# Patient Record
Sex: MALE | Race: WHITE | NOT HISPANIC OR LATINO | Employment: FULL TIME | URBAN - METROPOLITAN AREA
[De-identification: names, ages, dates, MRNs, and addresses within clinical notes are randomized per-mention and may not be internally consistent; named-entity substitution may affect disease eponyms.]

---

## 2023-03-20 ENCOUNTER — OFFICE VISIT (OUTPATIENT)
Dept: PRIMARY CARE | Facility: CLINIC | Age: 28
End: 2023-03-20
Payer: COMMERCIAL

## 2023-03-20 VITALS — DIASTOLIC BLOOD PRESSURE: 74 MMHG | WEIGHT: 204 LBS | TEMPERATURE: 97.6 F | SYSTOLIC BLOOD PRESSURE: 112 MMHG

## 2023-03-20 DIAGNOSIS — R53.83 FATIGUE, UNSPECIFIED TYPE: ICD-10-CM

## 2023-03-20 DIAGNOSIS — I10 HYPERTENSION, UNSPECIFIED TYPE: Primary | ICD-10-CM

## 2023-03-20 DIAGNOSIS — E78.5 HYPERLIPIDEMIA, UNSPECIFIED HYPERLIPIDEMIA TYPE: ICD-10-CM

## 2023-03-20 DIAGNOSIS — L72.9 CYST OF SCROTUM: ICD-10-CM

## 2023-03-20 PROCEDURE — 99385 PREV VISIT NEW AGE 18-39: CPT | Performed by: INTERNAL MEDICINE

## 2023-03-20 PROCEDURE — 3074F SYST BP LT 130 MM HG: CPT | Performed by: INTERNAL MEDICINE

## 2023-03-20 PROCEDURE — 3078F DIAST BP <80 MM HG: CPT | Performed by: INTERNAL MEDICINE

## 2023-03-20 NOTE — PATIENT INSTRUCTIONS
Discussed the cyst on the scrotum and the need to consult urology to remove it.  Also consulted with him about smoking cessation and to decide what date he is going to stop smoking, we also discussed weight loss gaining weight loss so fast is not going to be good he needs to lose weight and watch his diet starting today.  We will see him in 2-month.  [phone]

## 2023-03-20 NOTE — PROGRESS NOTES
Subjective   Patient ID: Vik Pineda is a 27 y.o. male who presents for Cyst.  And age related wellness exam.  HPI   27 years old male comes in to see me for an age-related wellness exam and the cyst on his scrotum.  He has no specific symptoms or complaint.  Other than gaining weight in the past weight since his daughter was born 4 months ago.  Review of Systems  Review of system is negative for 12 system reviewed.  Seasonal allergies and mold allergies.  Also penicillin.  He smokes half pack a day's for the last 4 years started at age 23.  Occasionally drinks alcohol.  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  No surgical history.  Mother is alive and healthy he does not know his father.  Half brother's half sister.  History of asthma last time he had an attack he was 11 years old.  He needs a rescue inhaler albuterol.  Cyst on the left side of the scrotum since age 18 and now is getting bigger.    Objective   /74 (BP Location: Left arm, Patient Position: Sitting, BP Cuff Size: Adult)   Temp 36.4 °C (97.6 °F)   Wt 92.5 kg (204 lb)     Physical Exam  Alert oriented in no acute distress pleasant and cooperative.  Nonicteric sclera or jaundice.  Face symmetrical cranial nerves intact.  He gained weight in the last year almost 40 pounds.  Neck supple no masses lymph no thyromegaly or jugular venous distention.  Lungs clear no rales wheezing or crackles.  Heart normal S1 and S2 regular rhythm.  Abdomen benign nontender no masses or organomegaly.  Neurologically intact.  Examination of the scrotum revealed a white visible looks like cyst lipoma kind cyst on the left side of the scrotum maybe half an inch in size.  Assessment/Plan   Diagnoses and all orders for this visit:  Hypertension, unspecified type  -     Comprehensive Metabolic Panel  Hyperlipidemia, unspecified hyperlipidemia type  -     Lipid Panel  Fatigue, unspecified type  -     CBC  -     Thyroid Stimulating Hormone  Cyst of  scrotum  -     Referral to Urology; Future

## 2023-05-12 ENCOUNTER — TELEPHONE (OUTPATIENT)
Dept: PRIMARY CARE | Facility: CLINIC | Age: 28
End: 2023-05-12
Payer: COMMERCIAL

## 2023-05-12 DIAGNOSIS — J02.9 SORE THROAT: ICD-10-CM

## 2023-05-12 RX ORDER — AZITHROMYCIN 250 MG/1
TABLET, FILM COATED ORAL
Qty: 6 TABLET | Refills: 0 | Status: SHIPPED | OUTPATIENT
Start: 2023-05-12 | End: 2023-05-17

## 2024-02-19 ENCOUNTER — HOSPITAL ENCOUNTER (EMERGENCY)
Facility: HOSPITAL | Age: 29
Discharge: HOME | End: 2024-02-19

## 2024-02-19 VITALS
WEIGHT: 165 LBS | BODY MASS INDEX: 25.01 KG/M2 | SYSTOLIC BLOOD PRESSURE: 124 MMHG | HEART RATE: 96 BPM | TEMPERATURE: 97.9 F | HEIGHT: 68 IN | RESPIRATION RATE: 18 BRPM | DIASTOLIC BLOOD PRESSURE: 78 MMHG | OXYGEN SATURATION: 98 %

## 2024-02-19 DIAGNOSIS — L02.01 ABSCESS OF FACE: Primary | ICD-10-CM

## 2024-02-19 PROCEDURE — 2500000001 HC RX 250 WO HCPCS SELF ADMINISTERED DRUGS (ALT 637 FOR MEDICARE OP)

## 2024-02-19 PROCEDURE — 99283 EMERGENCY DEPT VISIT LOW MDM: CPT

## 2024-02-19 RX ORDER — DOXYCYCLINE HYCLATE 100 MG
100 TABLET ORAL ONCE
Status: COMPLETED | OUTPATIENT
Start: 2024-02-19 | End: 2024-02-19

## 2024-02-19 RX ORDER — IBUPROFEN 600 MG/1
600 TABLET ORAL ONCE
Status: DISCONTINUED | OUTPATIENT
Start: 2024-02-19 | End: 2024-02-19 | Stop reason: HOSPADM

## 2024-02-19 RX ORDER — DOXYCYCLINE 100 MG/1
100 TABLET ORAL 2 TIMES DAILY
Qty: 20 TABLET | Refills: 0 | Status: SHIPPED | OUTPATIENT
Start: 2024-02-19 | End: 2024-02-29

## 2024-02-19 RX ORDER — ACETAMINOPHEN 325 MG/1
650 TABLET ORAL ONCE
Status: DISCONTINUED | OUTPATIENT
Start: 2024-02-19 | End: 2024-02-19 | Stop reason: HOSPADM

## 2024-02-19 RX ADMIN — DOXYCYCLINE HYCLATE 100 MG: 100 TABLET, COATED ORAL at 15:39

## 2024-02-19 ASSESSMENT — PAIN - FUNCTIONAL ASSESSMENT: PAIN_FUNCTIONAL_ASSESSMENT: 0-10

## 2024-02-19 ASSESSMENT — COLUMBIA-SUICIDE SEVERITY RATING SCALE - C-SSRS
2. HAVE YOU ACTUALLY HAD ANY THOUGHTS OF KILLING YOURSELF?: NO
1. IN THE PAST MONTH, HAVE YOU WISHED YOU WERE DEAD OR WISHED YOU COULD GO TO SLEEP AND NOT WAKE UP?: NO
6. HAVE YOU EVER DONE ANYTHING, STARTED TO DO ANYTHING, OR PREPARED TO DO ANYTHING TO END YOUR LIFE?: NO

## 2024-02-19 ASSESSMENT — PAIN SCALES - GENERAL
PAINLEVEL_OUTOF10: 2
PAINLEVEL_OUTOF10: 5 - MODERATE PAIN

## 2024-02-19 NOTE — ED PROVIDER NOTES
HPI   Chief Complaint   Patient presents with    Abscess       HPI  HPI: This is a 28 y.o. male who presents to the ER complaining of an abscess on the face.  Abscess on the right side of the face in front of the right ear.  Has been present for about 2 weeks, states that it spontaneously opened last night, this morning he woke up with drainage on his pillow.  He states that he tried to express as much pus as he could this morning, used a pair of scissors, was able to express drainage.  Has been tender throughout the day today, intermittently drained a small on a fluid.  He has no fevers or chills.  No ear pain, no muffled hearing.  No swelling or drainage within the mouth.  No tenderness or swelling into the neck.  No neck pain or stiffness.  No headache.  No chest pain or shortness of breath, no abdominal pain, no nausea or vomiting.  No history of diabetes or immunocompromise status.  Denies any past medical history.  No other complaints or symptoms voiced.    ROS:  General: No decreased responsiveness, no fever, chills  Neuro: no lightheadedness or dizziness, no numbness or tingling  ENMT: No nosebleed  Eyes: No discharge or redness  Skel: No joint pain  Cardiac: No chest pain   Resp: No shortness of breath  Skin: no rash +face abscess    PMH: denies  Social History: non smoker, no EtOH, no drugs  Family History: Noncontributory        No data recorded                   Patient History   No past medical history on file.  No past surgical history on file.  No family history on file.  Social History     Tobacco Use    Smoking status: Every Day     Packs/day: 0.50     Years: 4.00     Additional pack years: 0.00     Total pack years: 2.00     Types: Cigarettes     Start date: 2019    Smokeless tobacco: Never   Vaping Use    Vaping Use: Never used   Substance Use Topics    Alcohol use: Yes     Comment: occasionally    Drug use: Never       Physical Exam   ED Triage Vitals [02/19/24 1509]   Temperature Heart Rate  Respirations BP   36.6 °C (97.9 °F) 96 18 124/78      Pulse Ox Temp Source Heart Rate Source Patient Position   98 % Temporal -- --      BP Location FiO2 (%)     -- --       Physical Exam  General: Vital signs stable, Pt is alert, no acute distress  Eyes: Conjunctiva normal, PERRL, EOMs intact  HENMT: Normocephalic, atraumatic,  Moist mucous membranes.   Resp: Respiratory effort is normal,  Skin: No rashes, lesions or ulcers.  + 2 cm abscess anterior to the right ear, evidence of recent drainage, small amount of fluctuance, no purulence expressed with pressure.  Is very superficial, no extension inside the oropharynx, no extension into the neck or past the angle of the jaw.  No induration.  Minimally tender.  No significant overlying erythema or warmth.  No lymphadenopathy.  Normal ear canals and tympanic membrane.  Symmetric facial muscle movements, no sign of facial nerve involvement.  No vesicles or bulla.  No sloughing of skin.  Skel: full range of motion of upper and lower extremities.   Neuro: Normal gait, CN II-XII intact, no motor or sensory changes  Psych: Alert and oriented ×3, judgment is appropriate, normal mood and affect   ED Course & MDM   Diagnoses as of 02/19/24 1535   Abscess of face       Medical Decision Making  ED course / MDM     Summary:  Patient presented with an abscess on the face.  Present for about 2 weeks, spontaneously opened and began draining this morning.  No systemic symptoms.  Vital signs stable, patient is very well-appearing.  Alert and oriented, no acute distress.  There is a 2 cm abscess anterior to the right ear, small amount of fluctuance, no purulence expressed with pressure, is superficial, no sign of extension of infection or deep neck or facial space infection.  Discussed management options in detail with the patient.  As the abscess is already spontaneously opened and began draining, and the location is on the face, will defer incision and drainage at this time.  Will  treat with doxycycline, and have the patient follow-up with his PCP for a wound check in the next 2 to 3 days. Patient was given very strict return precautions, understands reasons to return to the ED. discussed return precautions at length.  Also discussed supportive care instructions and wound care instructions. I expressed the importance of outpatient follow up with their PCP. All questions were answered, patient expressed understanding and stated that they would comply.    Patient was advised to follow up with PCP or recommended provider in 2-3 days for another evaluation and exam. I advised patient and family/friend/caregiver/guardian to return or go to closest emergency room immediately if symptoms change, get worse, new symptoms develop prior to follow up. If there is no improvement in symptoms in the next 24 hours they are advised to return for further evaluation and exam. I also explained the plan and treatment course. Patient and family/friend/caregiver/guardian is in agreement with plan, treatment course, and follow up and states verbally that they will comply.    Impression:  1. See diagnosis    Plan: Homegoing. I discussed the differential, results, and discharge plan with the patient and family/friend/caregiver. I emphasized the importance of follow-up with the physician I referred them to in the timeframe recommended.  I explained reasons for the patient to return to the Emergency Department. They agreed that if they feel their condition is worsening or if they have any other concern they should call 911 immediately for further assistance. We also discussed medications that were prescribed including common side effects and interactions. The patient was advised to abstain from driving, operating heavy machinery, or making significant decisions while taking medications such as opiates and muscle relaxers that may impair this. I gave the patient an opportunity to ask all questions they had and answered  all of them accordingly. They understand return precautions and discharge instructions. The patient and family/friend/caregiver expressed understanding verbally and that they would comply.       Disposition: Discharge    This note has been transcribed using voice recognition and may contain grammatical errors, misplaced words, incorrect words, incorrect phrases or other errors.   Procedure  Procedures     Hannah Greene PA-C  02/19/24 8125

## 2024-02-19 NOTE — Clinical Note
Vik Pineda was seen and treated in our emergency department on 2/19/2024.  He may return to work on 02/21/2024.       If you have any questions or concerns, please don't hesitate to call.      Hannah Greene PA-C

## 2024-02-19 NOTE — ED TRIAGE NOTES
Pt arrived via private vehicle from home with chief c/o of abscess near R ear. Pt states he tried to drain the abscess this morning. Pt endorses pain at the site.

## 2024-06-26 ENCOUNTER — APPOINTMENT (OUTPATIENT)
Dept: RADIOLOGY | Facility: HOSPITAL | Age: 29
End: 2024-06-26

## 2024-06-26 ENCOUNTER — HOSPITAL ENCOUNTER (EMERGENCY)
Facility: HOSPITAL | Age: 29
Discharge: HOME | End: 2024-06-27
Attending: EMERGENCY MEDICINE

## 2024-06-26 VITALS
BODY MASS INDEX: 25.01 KG/M2 | SYSTOLIC BLOOD PRESSURE: 144 MMHG | TEMPERATURE: 96.8 F | HEART RATE: 94 BPM | WEIGHT: 165 LBS | RESPIRATION RATE: 18 BRPM | OXYGEN SATURATION: 98 % | HEIGHT: 68 IN | DIASTOLIC BLOOD PRESSURE: 98 MMHG

## 2024-06-26 DIAGNOSIS — N43.3 HYDROCELE IN ADULT: ICD-10-CM

## 2024-06-26 DIAGNOSIS — L72.3 SCROTAL SEBACEOUS CYST: Primary | ICD-10-CM

## 2024-06-26 DIAGNOSIS — I10 DIASTOLIC HYPERTENSION: ICD-10-CM

## 2024-06-26 DIAGNOSIS — I86.1 VARICOCELE: ICD-10-CM

## 2024-06-26 PROCEDURE — 96361 HYDRATE IV INFUSION ADD-ON: CPT

## 2024-06-26 PROCEDURE — 80053 COMPREHEN METABOLIC PANEL: CPT | Performed by: EMERGENCY MEDICINE

## 2024-06-26 PROCEDURE — 93975 VASCULAR STUDY: CPT

## 2024-06-26 PROCEDURE — 85025 COMPLETE CBC W/AUTO DIFF WBC: CPT | Performed by: EMERGENCY MEDICINE

## 2024-06-26 PROCEDURE — 99285 EMERGENCY DEPT VISIT HI MDM: CPT | Mod: 25

## 2024-06-26 PROCEDURE — 96374 THER/PROPH/DIAG INJ IV PUSH: CPT

## 2024-06-26 PROCEDURE — 2500000004 HC RX 250 GENERAL PHARMACY W/ HCPCS (ALT 636 FOR OP/ED): Performed by: EMERGENCY MEDICINE

## 2024-06-26 PROCEDURE — 93976 VASCULAR STUDY: CPT | Performed by: RADIOLOGY

## 2024-06-26 PROCEDURE — 76870 US EXAM SCROTUM: CPT

## 2024-06-26 PROCEDURE — 76870 US EXAM SCROTUM: CPT | Performed by: RADIOLOGY

## 2024-06-26 PROCEDURE — 36415 COLL VENOUS BLD VENIPUNCTURE: CPT | Performed by: EMERGENCY MEDICINE

## 2024-06-26 PROCEDURE — 83605 ASSAY OF LACTIC ACID: CPT | Performed by: EMERGENCY MEDICINE

## 2024-06-26 RX ORDER — KETOROLAC TROMETHAMINE 30 MG/ML
15 INJECTION, SOLUTION INTRAMUSCULAR; INTRAVENOUS ONCE
Status: COMPLETED | OUTPATIENT
Start: 2024-06-26 | End: 2024-06-26

## 2024-06-26 RX ORDER — ACETAMINOPHEN 325 MG/1
975 TABLET ORAL ONCE
Status: COMPLETED | OUTPATIENT
Start: 2024-06-26 | End: 2024-06-26

## 2024-06-26 RX ADMIN — KETOROLAC TROMETHAMINE 15 MG: 30 INJECTION, SOLUTION INTRAMUSCULAR at 23:47

## 2024-06-26 RX ADMIN — SODIUM CHLORIDE 1000 ML: 900 INJECTION, SOLUTION INTRAVENOUS at 23:47

## 2024-06-26 RX ADMIN — ACETAMINOPHEN 975 MG: 325 TABLET ORAL at 23:47

## 2024-06-26 ASSESSMENT — PAIN DESCRIPTION - PAIN TYPE: TYPE: ACUTE PAIN

## 2024-06-26 ASSESSMENT — PAIN - FUNCTIONAL ASSESSMENT: PAIN_FUNCTIONAL_ASSESSMENT: 0-10

## 2024-06-26 ASSESSMENT — PAIN DESCRIPTION - LOCATION: LOCATION: PENIS

## 2024-06-26 ASSESSMENT — COLUMBIA-SUICIDE SEVERITY RATING SCALE - C-SSRS
1. IN THE PAST MONTH, HAVE YOU WISHED YOU WERE DEAD OR WISHED YOU COULD GO TO SLEEP AND NOT WAKE UP?: NO
6. HAVE YOU EVER DONE ANYTHING, STARTED TO DO ANYTHING, OR PREPARED TO DO ANYTHING TO END YOUR LIFE?: NO
2. HAVE YOU ACTUALLY HAD ANY THOUGHTS OF KILLING YOURSELF?: NO

## 2024-06-26 ASSESSMENT — PAIN SCALES - GENERAL: PAINLEVEL_OUTOF10: 8

## 2024-06-26 NOTE — Clinical Note
Vik Pineda was seen and treated in our emergency department on 6/26/2024.  He may return to work on 06/29/2024.       If you have any questions or concerns, please don't hesitate to call.      Samia Simms MD

## 2024-06-27 ENCOUNTER — APPOINTMENT (OUTPATIENT)
Dept: RADIOLOGY | Facility: HOSPITAL | Age: 29
End: 2024-06-27

## 2024-06-27 LAB
ALBUMIN SERPL-MCNC: 4.6 G/DL (ref 3.5–5)
ALP BLD-CCNC: 77 U/L (ref 35–125)
ALT SERPL-CCNC: 28 U/L (ref 5–40)
ANION GAP SERPL CALC-SCNC: 11 MMOL/L
APPEARANCE UR: CLEAR
AST SERPL-CCNC: 19 U/L (ref 5–40)
BASOPHILS # BLD AUTO: 0.04 X10*3/UL (ref 0–0.1)
BASOPHILS NFR BLD AUTO: 0.3 %
BILIRUB SERPL-MCNC: 0.3 MG/DL (ref 0.1–1.2)
BILIRUB UR STRIP.AUTO-MCNC: NEGATIVE MG/DL
BUN SERPL-MCNC: 18 MG/DL (ref 8–25)
CALCIUM SERPL-MCNC: 8.7 MG/DL (ref 8.5–10.4)
CHLORIDE SERPL-SCNC: 104 MMOL/L (ref 97–107)
CO2 SERPL-SCNC: 23 MMOL/L (ref 24–31)
COLOR UR: COLORLESS
CREAT SERPL-MCNC: 0.9 MG/DL (ref 0.4–1.6)
EGFRCR SERPLBLD CKD-EPI 2021: >90 ML/MIN/1.73M*2
EOSINOPHIL # BLD AUTO: 0.22 X10*3/UL (ref 0–0.7)
EOSINOPHIL NFR BLD AUTO: 1.7 %
ERYTHROCYTE [DISTWIDTH] IN BLOOD BY AUTOMATED COUNT: 14.9 % (ref 11.5–14.5)
GLUCOSE SERPL-MCNC: 87 MG/DL (ref 65–99)
GLUCOSE UR STRIP.AUTO-MCNC: NORMAL MG/DL
HCT VFR BLD AUTO: 43.4 % (ref 41–52)
HGB BLD-MCNC: 14.7 G/DL (ref 13.5–17.5)
HOLD SPECIMEN: NORMAL
IMM GRANULOCYTES # BLD AUTO: 0.05 X10*3/UL (ref 0–0.7)
IMM GRANULOCYTES NFR BLD AUTO: 0.4 % (ref 0–0.9)
KETONES UR STRIP.AUTO-MCNC: NEGATIVE MG/DL
LACTATE BLDV-SCNC: 1.3 MMOL/L (ref 0.4–2)
LEUKOCYTE ESTERASE UR QL STRIP.AUTO: NEGATIVE
LYMPHOCYTES # BLD AUTO: 5.05 X10*3/UL (ref 1.2–4.8)
LYMPHOCYTES NFR BLD AUTO: 40 %
MCH RBC QN AUTO: 27 PG (ref 26–34)
MCHC RBC AUTO-ENTMCNC: 33.9 G/DL (ref 32–36)
MCV RBC AUTO: 80 FL (ref 80–100)
MONOCYTES # BLD AUTO: 1.19 X10*3/UL (ref 0.1–1)
MONOCYTES NFR BLD AUTO: 9.4 %
NEUTROPHILS # BLD AUTO: 6.06 X10*3/UL (ref 1.2–7.7)
NEUTROPHILS NFR BLD AUTO: 48.2 %
NITRITE UR QL STRIP.AUTO: NEGATIVE
NRBC BLD-RTO: 0 /100 WBCS (ref 0–0)
PH UR STRIP.AUTO: 5.5 [PH]
PLATELET # BLD AUTO: 324 X10*3/UL (ref 150–450)
POTASSIUM SERPL-SCNC: 4.3 MMOL/L (ref 3.4–5.1)
PROT SERPL-MCNC: 7.7 G/DL (ref 5.9–7.9)
PROT UR STRIP.AUTO-MCNC: NEGATIVE MG/DL
RBC # BLD AUTO: 5.45 X10*6/UL (ref 4.5–5.9)
RBC # UR STRIP.AUTO: NEGATIVE /UL
SODIUM SERPL-SCNC: 138 MMOL/L (ref 133–145)
SP GR UR STRIP.AUTO: 1.02
UROBILINOGEN UR STRIP.AUTO-MCNC: NORMAL MG/DL
WBC # BLD AUTO: 12.6 X10*3/UL (ref 4.4–11.3)

## 2024-06-27 PROCEDURE — 2550000001 HC RX 255 CONTRASTS: Performed by: EMERGENCY MEDICINE

## 2024-06-27 PROCEDURE — 81003 URINALYSIS AUTO W/O SCOPE: CPT | Performed by: EMERGENCY MEDICINE

## 2024-06-27 PROCEDURE — 74177 CT ABD & PELVIS W/CONTRAST: CPT

## 2024-06-27 PROCEDURE — 74177 CT ABD & PELVIS W/CONTRAST: CPT | Performed by: RADIOLOGY

## 2024-06-27 RX ORDER — DOXYCYCLINE 100 MG/1
100 TABLET ORAL 2 TIMES DAILY
Qty: 20 TABLET | Refills: 0 | Status: SHIPPED | OUTPATIENT
Start: 2024-06-27 | End: 2024-07-07

## 2024-06-27 RX ORDER — IBUPROFEN 600 MG/1
600 TABLET ORAL EVERY 6 HOURS PRN
Qty: 20 TABLET | Refills: 0 | Status: SHIPPED | OUTPATIENT
Start: 2024-06-27 | End: 2024-07-02

## 2024-06-27 RX ADMIN — IOHEXOL 75 ML: 350 INJECTION, SOLUTION INTRAVENOUS at 00:40

## 2024-06-27 ASSESSMENT — LIFESTYLE VARIABLES
HAVE PEOPLE ANNOYED YOU BY CRITICIZING YOUR DRINKING: NO
EVER HAD A DRINK FIRST THING IN THE MORNING TO STEADY YOUR NERVES TO GET RID OF A HANGOVER: NO
HAVE YOU EVER FELT YOU SHOULD CUT DOWN ON YOUR DRINKING: NO
TOTAL SCORE: 0
EVER FELT BAD OR GUILTY ABOUT YOUR DRINKING: NO

## 2024-06-27 NOTE — ED TRIAGE NOTES
"Pt presents ambulatory through triage. Pt states he has had a cyst on his scrotum for 10 years. He has been seen for this but stated nothing has been done. Pt came in today because he said while at work it \"popped\" and started to drain and the pain is unbearable. Pt did not clean the wound with anything before arrival. Pt did take excedrine for the pain but states it did not help.  "

## 2024-06-27 NOTE — ED PROVIDER NOTES
HPI   Chief Complaint   Patient presents with    Cyst       HPI                    Marc Coma Scale Score: 15                     Patient History   History reviewed. No pertinent past medical history.  History reviewed. No pertinent surgical history.  No family history on file.  Social History     Tobacco Use    Smoking status: Every Day     Current packs/day: 0.50     Average packs/day: 0.5 packs/day for 5.5 years (2.7 ttl pk-yrs)     Types: Cigarettes     Start date: 2019    Smokeless tobacco: Never   Vaping Use    Vaping status: Never Used   Substance Use Topics    Alcohol use: Yes     Comment: occasionally    Drug use: Never       Physical Exam   ED Triage Vitals [06/26/24 2258]   Temperature Heart Rate Respirations BP   36 °C (96.8 °F) 94 18 (!) 144/98      Pulse Ox Temp Source Heart Rate Source Patient Position   98 % Temporal Monitor Sitting      BP Location FiO2 (%)     Left arm --       Physical Exam    ED Course & MDM   Diagnoses as of 06/27/24 0510   Scrotal sebaceous cyst   Varicocele   Hydrocele in adult   Diastolic hypertension       Medical Decision Making    The patient is a 28-year-old male presenting to the emergency department for evaluation of left-sided scrotal/testicular pain.  He states he has had a lesion on the left side of his scrotum for the past 10 years.  He states that has been seen by his primary care physician in the past 4.  He was given a referral to urology to have the lesion removed but he never got around to having it done because he was busy with work.  He states that his baby jumped on him 3 to 4 days ago and caused him to have increasing pain.  He states it was draining after that.  He states that he is hoping to get something done with it now because it is painful.  He denies any other injury or trauma.  He denies any headache or visual changes.  No chest pain or shortness of breath.  No abdominal pain.  No nausea or vomiting.  No diarrhea or constipation but no urinary  complaints.  All pertinent positives and negatives are recorded above.  All other systems reviewed and otherwise negative.  Vital signs with diastolic hypertension but otherwise within normal limits.  Physical exam with a well-nourished well-developed male in no acute distress.  HEENT exam within normal limits.  He has no evidence of airway compromise or respiratory distress.  Abdominal exam is benign.  He has no gross motor, neurologic or vascular deficits on exam.  He does have what appears to be a sebaceous cyst on the left side of his scrotum.  No evidence of testicular torsion on exam.      Oral acetaminophen, IV Toradol and IV fluids ordered.      Diagnostic labs with mild leukocytosis but otherwise unremarkable.      Lactic acid 1.3      CT abdomen pelvis w IV contrast   Final Result   Moderate stool burden.        Wall thickening of the urinary bladder noted. Correlation for   cystitis recommended.        No subcutaneous emphysema noted in the visualized groin region.        MACRO:   None        Signed by: Priscilla Kraft 6/27/2024 1:15 AM   Dictation workstation:   [id number]      US scrotum w doppler   Final Result   1.7 x 1.2 x 1.6 cm rounded heterogeneous collection within the   scrotum with increased vascularity compatible with abscess extending   close to the skin surface. Normal sonographic appearance of the   testicles without evidence of torsion. Moderate right hydrocele and   moderate-sized left varicocele.        MACRO:   None.        Signed by: Evan Finkelstein 6/26/2024 11:56 PM   Dictation workstation:   [id number]           The patient does not have any evidence of hemodynamic instability.  He has a benign abdominal exam.  No evidence of Saad's on exam and/or by CT imaging.  He does have a moderate stool burden and some wall thickening of the urinary bladder but no other evidence of acute process on CT abdomen and pelvis.  No evidence of appendicitis, diverticulitis, small bowel  obstruction, mass, pancreatitis or choledocholithiasis.  The patient does not have any evidence of urinary tract infection.  The patient does have a 1.7 x 1.2 x 1.6 rounded heterogeneous collection within the scrotum with increased vascularity compatible with possible abscess.  No evidence of torsion.  He also has a right-sided hydrocele and a left-sided varicocele      The exam is most consistent with a sebaceous cyst and this fits with a history of being present for 10 years.  Given that he is complaining of pain and current drainage, he was given prescription for doxycycline and ibuprofen.        He was released in good condition.  He was instructed to follow-up with his primary care physician within 1 to 2 days for further management of his current symptoms, repeat check of his blood pressure, and review of the urine culture results.  He was also given a referral to urology for further management of the scrotal cyst.  He will return to the emergency department sooner with worsening of symptoms or onset of new symptoms.        Impression/diagnosis  Sebaceous cyst, left side of scrotum  Diastolic hypertension  Right-sided hydrocele  Left-sided varicocele      I reviewed the results of the diagnostic labs and diagnostic imaging.  Formal radiology reading was completed by the radiologist    Procedure  Procedures     Samia Simms MD  06/27/24 4904       Samia Simms MD  06/27/24 2215

## 2024-06-27 NOTE — DISCHARGE INSTRUCTIONS
Follow-up with your primary care physician within 1 to 2 days for further management of your current symptoms and repeat check of your blood pressure.    Follow-up with urology for further evaluation of the scrotal cyst      Return to the emergency department sooner with worsening of symptoms or onset of new symptoms